# Patient Record
Sex: MALE | URBAN - METROPOLITAN AREA
[De-identification: names, ages, dates, MRNs, and addresses within clinical notes are randomized per-mention and may not be internally consistent; named-entity substitution may affect disease eponyms.]

---

## 2020-03-02 ENCOUNTER — OFFICE VISIT (OUTPATIENT)
Dept: PULMONOLOGY | Age: 60
End: 2020-03-02

## 2020-03-02 VITALS
WEIGHT: 168.2 LBS | OXYGEN SATURATION: 97 % | HEIGHT: 68 IN | HEART RATE: 86 BPM | TEMPERATURE: 97.7 F | SYSTOLIC BLOOD PRESSURE: 149 MMHG | BODY MASS INDEX: 25.49 KG/M2 | DIASTOLIC BLOOD PRESSURE: 90 MMHG | RESPIRATION RATE: 18 BRPM

## 2020-03-02 DIAGNOSIS — Z23 ENCOUNTER FOR IMMUNIZATION: ICD-10-CM

## 2020-03-02 DIAGNOSIS — E66.3 OVERWEIGHT (BMI 25.0-29.9): ICD-10-CM

## 2020-03-02 DIAGNOSIS — I10 ESSENTIAL HYPERTENSION: ICD-10-CM

## 2020-03-02 DIAGNOSIS — E78.5 DYSLIPIDEMIA: ICD-10-CM

## 2020-03-02 DIAGNOSIS — R09.81 NASAL CONGESTION: ICD-10-CM

## 2020-03-02 DIAGNOSIS — R06.83 SNORING: Primary | ICD-10-CM

## 2020-03-02 DIAGNOSIS — J34.2 DEVIATED SEPTUM: ICD-10-CM

## 2020-03-02 DIAGNOSIS — G47.19 EXCESSIVE DAYTIME SLEEPINESS: ICD-10-CM

## 2020-03-02 RX ORDER — AMOXICILLIN AND CLAVULANATE POTASSIUM 875; 125 MG/1; MG/1
1 TABLET, FILM COATED ORAL
COMMUNITY
Start: 2018-09-30 | End: 2020-03-02 | Stop reason: ALTCHOICE

## 2020-03-02 RX ORDER — MOXIFLOXACIN 5 MG/ML
1 SOLUTION/ DROPS OPHTHALMIC
COMMUNITY
Start: 2018-09-30 | End: 2020-03-02 | Stop reason: ALTCHOICE

## 2020-03-02 RX ORDER — CETIRIZINE HCL 10 MG
10 TABLET ORAL DAILY
COMMUNITY

## 2020-03-02 RX ORDER — TELMISARTAN 20 MG/1
20 TABLET ORAL DAILY
COMMUNITY

## 2020-03-02 RX ORDER — GUAIFENESIN 100 MG/5ML
81 LIQUID (ML) ORAL DAILY
COMMUNITY

## 2020-03-02 RX ORDER — ATORVASTATIN CALCIUM 20 MG/1
20 TABLET, FILM COATED ORAL DAILY
COMMUNITY

## 2020-03-02 NOTE — LETTER
3/5/20 Patient: Jacqueline Saavedra YOB: 1960 Date of Visit: 3/2/2020 Mervin Harman NP 
[address] [place] [id number] VIA Facsimile: [phone] Dear Mervin Harman NP, Thank you for referring Mr. Amari Hunt to [address] [place] [address] for evaluation. My notes for this consultation are attached. If you have questions, please do not hesitate to call me. I look forward to following your patient along with you.  
 
 
Sincerely, 
 
Mariola Forrester, DO

## 2020-03-02 NOTE — PROGRESS NOTES
Micah Johnson presents today for   Chief Complaint   Patient presents with   Ede Hinton Referral / Consult     referred by JAMEE Sullivan for sleep evaluation d/t snoring & insomnia       Is someone accompanying this pt? No    Is the patient using any DME equipment during OV? No    -DME Company N/A    Depression Screening:  3 most recent PHQ Screens 3/2/2020   Feeling down, depressed, irritable, or hopeless Not at all       Learning Assessment:  Learning Assessment 3/2/2020   PRIMARY LEARNER Patient   PRIMARY LANGUAGE ENGLISH   LEARNER PREFERENCE PRIMARY DEMONSTRATION   ANSWERED BY Patient   RELATIONSHIP SELF       Abuse Screening:  No flowsheet data found. Fall Risk  No flowsheet data found. Coordination of Care:  1. Have you been to the ER, urgent care clinic since your last visit? Hospitalized since your last visit? No    2. Have you seen or consulted any other health care providers outside of the [address] [place] [address] since your last visit? Include any pap smears or colon screening. Yes.  [facility]

## 2020-03-02 NOTE — PROGRESS NOTES
[facility]  Pulmonary, Critical Care, and Sleep Medicine    Office Progress Note- Initial Evaluation      Primary Care Physician: Eneida Avila NP     Reason for Visit:  Evaluation for possible sleep disorder    Assessment:  1. Snoring: Patient has symptoms and exam findings highly suggestive of a sleep breathing disorder, such as BERNADETTE. Given severity of symptoms and comorbidities additional  sleep testing is indicated. 2. Excessive Daytime Sleepiness (EDS)- possibly related to BERNADETTE, nasal congestion and/or sleep fragmentation  3. Hypertension  4. Deviated Septum: Acquired with history of repeat contact injury  5. Tinnitus- per patient, does not interfere with sleep  6. Dyslipidemia  7. Overweight: Body mass index is 25.57 kg/m². 8. Immunization:- Flu shot today        Plan:  ·   · Schedule patient for home sleep study for further evaluation. · Potential consequences of untreated sleep apnea, and/or excessive daytime sleepiness were discussed with the patient. · Educational materials provided. · Treatment options including CPAP, dental appliance, weight reduction measures, positional therapy, surgeries etc were discussed. · Healthy lifestyle changes to include weight loss and exercise discussed. · Healthy sleep habits were reviewed and encouraged. ·  and workplace safety reviewed and discussed as appropriate. Drowsy and/or inattentive driving should be avoided. · Follow-up in after sleep testing , sooner should new symptoms or problems arise. · Follow up with Primary Care Provider (PCP) as directed and for routine health care maintenance. History of Present Illness: Mr. Eleuterio Ag is a 63 y.o. male patient who presents for evaluation of disruptive snoring. The history was provided by the patient    Occupation:    Office Management (Office Work)                     Work Schedule: 3951-0499: M-F  Shift work: None    Driving:  yes  Drowsy Driving: is not reported. Motor vehicle accident(s) associated with drowsy driving have not occurred. Snoring: This is a Chronic problem which has been ongoing for years. Has been told through the years that he snores loudly. Witnessed apneas are not reported. Fatigue: This is a Chronic problem which has been ongoing for years. Severity of fatigue fluctuates. Dental: Teeth clenching or grinding is not reported. Naps: are reported. Naps are spontaneous and occurs if not engaged or focused on an activity. Leg Symptoms/Pain: He does not have unpleasant or crawling sensation in legs or strong urge to move when inactive. GERD: is reported 1-2 times / month. Treated with OTC anti-acid. It has woken patient up from sleep with near aspiration of water - brash- last episode Summer 2019    Mood:  Even keeled. No feelings of anxiety and/or depression. Sleep-Wake History:       Estimates sleeping approximately 5-6 hours per night/day. Reports sleeping in a Bed with 1-2 pillows under their head. He gets into bed at approximately 1518-0025. Once in bed,he may read a paperbook. It usually takes up to 30 minutes to fall asleep after going to bed. Reports waking up to use the bathroom 0 times nightly. Pain, typically does not disturb their sleep. Vivid dreams are not reported. He normally awakens with an alarm to start their day at 0600. He  typically gets out of bed 0600 . Waking up with a morning headache is not reported. Awakening with a dry mouth is reported. Symptom(s) suggestive of cataplexy are not reported. Sleep paralysis is not  reported. Hypnagogic and/or hypnopompic hallucinations are not reported. Sleep walking is not  reported. Sleep talking is not  reported. Other unusual and/or parasomnia behaviors are not reported.     Family Sleep History:  - none known        Stop Alayna Villela 3/2/2020   Does the patient snore loudly (louder than talking or loud enough to be heard through closed doors)? 1   Does the patient often feel tired, fatigued, or sleepy during the daytime, even after a \"good\" night's sleep? 1   Has anyone ever observed the patient stop breathing during their sleep?  0   Does the patient have or are they being treated for high blood pressure? 1   Is the patient's BMI greater than 35? 0   Is your neck circumference greater than 17 inches (Male) or 16 inches (Female)? 0   Is the patient older than 48? 1   Is the patient male? 1   BERNADETTE Score 5       3 most recent PHQ Screens 3/2/2020   Feeling down, depressed, irritable, or hopeless Not at all       [place] Scale 3/2/2020   Sitting and Reading 2   Watching TV 2   Sitting, inactive in a public place (e.g. a movie theater or meeting) 2   As a passenger in a car for an hour, without a break 1   Lying down to rest in the afternoon, when circumstances permit 3   Sitting and talking to someone 0   Sitting quietly after lunch without alcohol 2   In a car, while stopped for a few minutes in traffic 0   [place] Sleepiness Score 12        Neck circ. in \"inches\": 15.5         Past Medical History:  History reviewed. No pertinent past medical history. Past Surgical History:  History reviewed. No pertinent surgical history. Family History:  Family History   Problem Relation Age of Onset    Cancer Father         lung    Diabetes Father     Hypertension Father        Social History:      Caffeine Amount Time of last Intake Comments   Coffee 2 C/day morning    Soda Rare     Tea Rare     Energy Drinks None     Over- the - counter stimulant pills None     Other Substances      Alcohol 1-2 x/week Evening Cocktail: Vodka or Whiskey   Tobacco None     Drugs None     Other: None         Medications:  Current Outpatient Medications on File Prior to Visit   Medication Sig Dispense Refill    telmisartan (MICARDIS) 20 mg tablet Take 20 mg by mouth daily.  atorvastatin (LIPITOR) 20 mg tablet Take 20 mg by mouth daily.       aspirin 81 mg chewable tablet Take 81 mg by mouth daily.  fish oil-omega-3 fatty acids (FISH OIL) 340-1,000 mg capsule Take 1 Cap by mouth daily.  cetirizine (ZYRTEC) 10 mg tablet Take 10 mg by mouth daily. No current facility-administered medications on file prior to visit. Allergy:  Not on File    Review of Systems  General ROS: positive for  - fatigue and sleep disturbance  negative for - chills, fever, hot flashes, malaise or night sweats  ENT ROS: negative for - epistaxis, hearing change, nasal discharge, nasal polyps, oral lesions, sinus pain, sneezing, sore throat, vertigo, visual changes or vocal changes- positive for chronic nasal congestion, deviated septum with repeat injuries to nose, tinnitus- does not interfere with sleep  Hematological and Lymphatic ROS: negative for - bleeding problems, blood clots, bruising, jaundice, pallor or swollen lymph nodes  Endocrine ROS: negative for - polydipsia/polyuria, skin changes, temperature intolerance or unexpected weight changes  Respiratory ROS: no cough, shortness of breath, or wheezing  Cardiovascular ROS: no chest pain or dyspnea on exertion  Gastrointestinal ROS: no abdominal pain, change in bowel habits, or black or bloody stools  Genito-Urinary ROS: no dysuria, trouble voiding, or hematuria  Musculoskeletal ROS: negative for - gait disturbance, joint pain, joint stiffness, joint swelling, muscle pain or muscular weakness  Neurological ROS: no TIA or stroke symptoms  Dermatological ROS: negative for - pruritus, rash or skin lesion changes   Psychological ROS: as above   Otherwise negative. Physical Exam:  Blood pressure 149/90, pulse 86, temperature 97.7 °F (36.5 °C), temperature source Oral, resp. rate 18, height 5' 8\" (1.727 m), weight 76.3 kg (168 lb 3.2 oz), SpO2 97 %. on RA, Body mass index is 25.57 kg/m².      General: No distress, acyanotic, appears stated age, cooperative, pleasant  HEENT: PERRL, EOMI, throat without erythema or exudate, Tongue- dental indention on tongue, Mallampati's score 2+, Uvula- midline, Tonsils- not seen, + deviated septum  Neck: Supple,  no abnormally enlarged lymph nodes, thyroid is not enlarged, non-tender, no JVD, no carotid bruits  Chest: Normal.  Lungs: Moderate air entry, clear to auscultation bilaterally,   Heart: Regular rate and rhythm, S1S2 present, without murmur. Abdomen: Protuberant, bowel sounds normoactive, abdomen is soft without significant tenderness, or guarding. Extremity: Negative for cyanosis, edema, or clubbing. Skin: Skin color, texture, turgor normal. No rashes or lesions. Neurological: CN 2-12 grossly intact, normal muscle tone. Data Reviewed:  CBC: No results found for: WBC, WBCLT, HGBPOC, HGB, HGBP, HCTPOC, HCT, PHCT, RBCH, PLT, MCV, HGBEXT, HCTEXT, PLTEXT    BMP: No results found for: NA, K, CL, CO2, AGAP, GLU, BUN, CREA, BUCR, GFRAA, GFRNA, CA, GFRAA     TSH:  No results found for: TSH, TSH2, TSH3, TSHP, TSHELE, TSHEXT    Imaging:  [x]I have personally reviewed the patients radiographs section   No results found for this or any previous visit. No results found for this or any previous visit.      Cardiac Echo:          Historical Sleep Testing Data:        Mateo Hall DO, [facility]  Pulmonary, Sleep and Critical Care Medicine

## 2020-03-02 NOTE — PROGRESS NOTES
Jose Antonio Hart is a 63 y.o. male who presents for routine immunizations. He denies any symptoms , reactions or allergies that would exclude them from being immunized today. Risks and adverse reactions were discussed and the VIS was given to them. All questions were addressed. He was observed for 10 min post injection. There were no reactions observed.     Kalyan Hicks LPN

## 2020-04-16 ENCOUNTER — HOSPITAL ENCOUNTER (OUTPATIENT)
Dept: SLEEP MEDICINE | Age: 60
Discharge: HOME | End: 2020-04-16
Payer: OTHER GOVERNMENT

## 2020-04-16 DIAGNOSIS — E66.3 OVERWEIGHT (BMI 25.0-29.9): ICD-10-CM

## 2020-04-16 DIAGNOSIS — R06.83 SNORING: ICD-10-CM

## 2020-04-16 DIAGNOSIS — J34.2 DEVIATED SEPTUM: ICD-10-CM

## 2020-04-16 DIAGNOSIS — E78.5 DYSLIPIDEMIA: ICD-10-CM

## 2020-04-16 DIAGNOSIS — I10 ESSENTIAL HYPERTENSION: ICD-10-CM

## 2020-04-16 DIAGNOSIS — G47.19 EXCESSIVE DAYTIME SLEEPINESS: ICD-10-CM

## 2020-04-16 DIAGNOSIS — Z23 ENCOUNTER FOR IMMUNIZATION: ICD-10-CM

## 2020-04-16 DIAGNOSIS — R09.81 NASAL CONGESTION: ICD-10-CM

## 2020-04-16 PROCEDURE — 95806 SLEEP STUDY UNATT&RESP EFFT: CPT

## 2020-04-17 ENCOUNTER — HOSPITAL ENCOUNTER (OUTPATIENT)
Dept: SLEEP MEDICINE | Age: 60
Discharge: HOME | End: 2020-04-17
Payer: OTHER GOVERNMENT

## 2020-04-17 PROCEDURE — 95806 SLEEP STUDY UNATT&RESP EFFT: CPT

## 2020-04-30 DIAGNOSIS — G47.31 COMPLEX SLEEP APNEA SYNDROME: Primary | ICD-10-CM

## 2020-04-30 NOTE — PROGRESS NOTES
The patient underwent sleep testing on 4/16/2020. Recommendations listed below. Please refer to full report for specific details. Interpretation   Complex Sleep Apnea (CSA): AHI: 20.2, MEGAN: 5.4 without Cheyne Conroy Respiration   The Oxygen Desaturation Index (YOVANA) was 20.9 and the SpO2 ibis for this study was  81%. The average SpO2 was 91%   The heart rate ranged between 62 and 114 bpm. The average heart rate was 76 bpm   Start APAP 8/20/cwp. - Monitor for signs of persistent CSA. If CSA persists then patient  may need BIPAP with possible backup rate and future in-lab sleep testing   Other non-invasive treatment options are recommended were applicable and include  the following: weight reduction, smoking cessation, body position training, and modification of  alcohol ingestion and/or sedating agents.  Healthy sleep habit education and reinforcement will be reviewed with the patient.  Individuals are encouraged to obtain 7-9 hours of sleep per day.   safety is encouraged. Drowsy and/or inattentive driving should be avoided.  Follow up with referring provider as directed.     Fatimah Tamayo DO, [facility]    [facility]  Pulmonary, Critical Care, and Sleep Medicine

## 2020-05-05 ENCOUNTER — VIRTUAL VISIT (OUTPATIENT)
Dept: PULMONOLOGY | Age: 60
End: 2020-05-05

## 2020-05-05 DIAGNOSIS — E66.3 OVERWEIGHT (BMI 25.0-29.9): ICD-10-CM

## 2020-05-05 DIAGNOSIS — R09.81 NASAL CONGESTION: ICD-10-CM

## 2020-05-05 DIAGNOSIS — J31.0 RHINITIS, UNSPECIFIED TYPE: Primary | ICD-10-CM

## 2020-05-05 DIAGNOSIS — I10 ESSENTIAL HYPERTENSION: ICD-10-CM

## 2020-05-05 DIAGNOSIS — G47.33 OSA (OBSTRUCTIVE SLEEP APNEA): ICD-10-CM

## 2020-05-05 RX ORDER — FLUTICASONE PROPIONATE 50 MCG
2 SPRAY, SUSPENSION (ML) NASAL DAILY
Qty: 1 BOTTLE | Refills: 4 | Status: SHIPPED | OUTPATIENT
Start: 2020-05-05

## 2020-05-05 NOTE — PROGRESS NOTES
Consent: Sridhar Floyd, who was seen by synchronous (real-time) audio-video technology, and/or his healthcare decision maker, is aware that this patient-initiated, Telehealth encounter on 5/5/2020 is a billable service, with coverage as determined by his insurance carrier. He is aware that he may receive a bill and has provided verbal consent to proceed: Yes. Assessment & Plan:      Assessment:  1. Complex Sleep Apnea (CSA): HST 4/16/20- AHI: 20.2 and MEGAN: 5.4- Ideally, I would send the patient for an in-lab PAP titration study to further assess central apneas and mode of PAP therapy  2. Excessive Daytime Sleepiness (EDS)- possibly related to BERNADETTE/CSA, nasal congestion and/or sleep fragmentation  3. Hypertension  4. Deviated Septum: Acquired with history of repeat contact injury  5. Rhinitis: Patient ist aking decongestant, did not see benefit from Zyrtec  6. Tinnitus- per patient, does not interfere with sleep  7. Dyslipidemia  8. Overweight: Body mass index is 25.57 kg/m². - Based on last measured weight      Plan:   Will start patient on a trial of APAP therapy and monitor for response. If central events due not resolve, the patient may need A-BIPAP, BIPAP-ST or other NIV mode. Order has already been placed to DME.  COVID-19: general and PAP precautions discussed- Patient should use PAP device in separate room away from other individuals until we have more information about COVID-19   Proper PAP compliance, usage, hygiene and compliance requirements reviewed and discussed   Will start patient on a trial of Flonase for ongoing nasal congestion   Healthy weight and sleep habits encouraged    safety reviewed   Follow up with Primary Care Provider (PCP) as directed and for routine health care maintenance.  Patient is encouraged to contact our clinic if patient is experiencing in difficulties with using PAP device.  Follow up in our clinic in 3 months; sooner if needed.     Further recommendations pending clinical course. I spent at least 15 minutes with this established patient, and >50% of the time was spent counseling and/or coordinating care regarding CSA and treatment plan   Total Video Time: 19:47  712  Subjective:   Kristen Tolentino is a 63 y.o. male who was seen for Sleep Problem (F/U to 3/2 Results of Sleep Study 4/16)    Since last visit, the patient underwent HST testing. He was noted to have severe Complex Sleep Apnea (CSA). Results were discussed with patient today  PAP therapy is best treatment option at this time  Patient is agreeable to using PAP therapy  PAP therapy was discussed and that we would start with a trial of APAP but if central events did not resolve, we may need to use another mode of therapy  Patient continues with chronic nasal congestion. 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 Authorizing Provider   telmisartan (MICARDIS) 20 mg tablet Take 20 mg by mouth daily. Yes Provider, Historical   atorvastatin (LIPITOR) 20 mg tablet Take 20 mg by mouth daily. Yes Provider, Historical   aspirin 81 mg chewable tablet Take 81 mg by mouth daily. Yes Provider, Historical   fish oil-omega-3 fatty acids (FISH OIL) 340-1,000 mg capsule Take 1 Cap by mouth daily. Yes Provider, Historical   cetirizine (ZYRTEC) 10 mg tablet Take 10 mg by mouth daily. Yes Provider, Historical     No Known Allergies    No Known Allergies  No past medical history on file. No past surgical history on file.   Family History   Problem Relation Age of Onset    Cancer Father         lung    Diabetes Father     Hypertension Father      Social History     Tobacco Use    Smoking status: Never Smoker    Smokeless tobacco: Never Used   Substance Use Topics    Alcohol use: Yes     Alcohol/week: 2.0 standard drinks     Types: 2 Shots of liquor per week       ROS- per HPI        Objective:   Vital Signs: (As obtained by patient/caregiver at home)  There were no vitals taken for this visit. Constitutional: [x] Appears well-developed and well-nourished [x] No apparent distress          Mental status: [x] Alert and awake  [x] Oriented to person/place/time [x] Able to follow commands         Eyes:   EOM    [x]  Normal       Sclera  [x]  Normal               Discharge [x]  None visible        HENT: [x] Normocephalic, atraumatic    [x] Mouth/Throat: Mucous membranes are moist    External Ears [x] Normal      Neck: [x] No visualized mass     Pulmonary/Chest: [x] Respiratory effort normal   [x] No visualized signs of difficulty breathing or respiratory distress      Musculoskeletal:           [x] Normal range of motion of neck           Neurological:        [x] No Facial Asymmetry (Cranial nerve 7 motor function) (limited exam due to video visit)          [x] No gaze palsy              Skin:        [x] No significant exanthematous lesions or discoloration noted on facial skin      -            Psychiatric:       [x] Normal Affect         [x] No Hallucinations             We discussed the expected course, resolution and complications of the diagnosis(es) in detail. Medication risks, benefits, costs, interactions, and alternatives were discussed as indicated. I advised him to contact the office if his condition worsens, changes or fails to improve as anticipated. He expressed understanding with the diagnosis(es) and plan. Sánchez Shay is a 63 y.o. male being evaluated by a video visit encounter for concerns as above.   A caregiver was present when appropriate. Due to this being a TeleHealth encounter (During [place]-96 public health emergency), evaluation of the following organ systems was limited: Vitals/Constitutional/EENT/Resp/CV/GI//MS/Neuro/Skin/Heme-Lymph-Imm. Pursuant to the emergency declaration under the [address] [place] [address] waiver authority and the [employer] Act, this Virtual  Visit was conducted, with patient's (and/or legal guardian's) consent, to reduce the patient's risk of exposure to COVID-19 and provide necessary medical care. Services were provided through a video synchronous discussion virtually to substitute for in-person clinic visit.            Ginna Conn DO, [facility]    [facility]  Pulmonary, Critical Care, and Sleep Medicine